# Patient Record
(demographics unavailable — no encounter records)

---

## 2025-01-24 NOTE — PHYSICAL EXAM
[Healthy Appearing] : healthy appearing [Well Nourished] : well nourished [Interactive] : interactive [Normal Appearance] : normal appearance [Well formed] : well formed [Normally Set] : normally set [6 yr Molar Erupted] : 6 year molars have erupted [12 yr Molar Eruption] : 12 year molars have erupted [None] : there were no thyroid nodules [Normal S1 and S2] : normal S1 and S2 [Clear to Ausculation Bilaterally] : clear to auscultation bilaterally [Abdomen Soft] : soft [Abdomen Tenderness] : non-tender [] : no hepatosplenomegaly [2] : was Roger stage 2 [Scant] : scant [___] : [unfilled] [Normal] : normal  [Acanthosis Nigricans___] : no acanthosis nigricans [Goiter] : no goiter [Murmur] : no murmurs [Scoliosis] : scoliosis not appreciated [de-identified] : No hyperpigmentation noted [de-identified] : PERRL, sclera clear [FreeTextEntry1] : one axillary hair on the right [de-identified] : Normal carrying angle [TextEntry] : Dr. Yessy Chand family medicine PGY-3 was the chaperone for the pubertal exam  Armspan: 152cm [normal] UE: LE Ratio: 0.9 [UE 68.7cm, LE 76.4cm] [normal]

## 2025-01-24 NOTE — REVIEW OF SYSTEMS
[TextEntry] : Review of systems positive for: anxiety, constipation  Review of systems negative for weight loss, weight gain, fatigue, appetite changes, difficulty with sleep, cold sensitivity, increased thirst, increased urination, waking at night to urinate, unexplained fevers, headaches, loss of consciousness, seizures, blurred vision, double vision, wears glasses/contacts, hearing problems, low or no sense of smell, heart murmur, palpitations, snoring, shortness of breath, abdominal pain, nausea/vomiting, joint pain, muscle pain, hair loss, rashes, dry skin, acne, easy bruising, depression, behavioral concerns, pubic hair before 9 years, body odor before 9 years, breast development

## 2025-01-24 NOTE — FAMILY HISTORY
[TextEntry] : Dad: 43 years old; 72in about 170-180 lb; no PMHx Mom: 48 years old; 62in; 165 lb; menarche at 11 years old; no PMHx Maternal Half-Brother [Miguel]: 27 years old; 8pt66gr; 180 lb; no PMHx Brother [Kodi Carr]: 15 years old; 5ft9lb; 185 lb; autism MPH 69.5in +/- 4in  MGF about 8kg11kd MGM 5ft6/7in MAunts x3 [id number], 3hu53dk, 5ft6in MUncle [id number] PGF about 5ft8in PGM about 5f6in PUncle x2 5ft9in, 5ft8in PAunt about 5ft1in/2in  Lewy Body Dementia: MGM  from it at 69 years old Obesity: PGF MGM, MGF DORA MGF  There is no known past medical history of diabetes, thyroid disease, early puberty, late puberty, short stature, disorders of calcium, other hormone conditions, high cholesterol, hypertension, bone disease, liver disease, cancer, kidney disease, lung disease, heart disease, infertility, birth defects, PCOS, celiac disease, pituitary disease, NAFLD, weight loss surgery, lupus, RA, Crohn's disease/Ulcerative colitis, MI, blood clots, adrenal problems, osteoporosis, frequent fractures.

## 2025-01-24 NOTE — ADDENDUM
[FreeTextEntry1] : Addendum 1/24/2025: - Bone Age (1/20/2025): I have independently reviewed the bone age x-ray according to the [place] [facility] [employer]. It is closest to the 12y6m standard, vs chronological age 12y2m [agree with radiology read]. The bone age is appropriate for age.  Predicted adult height is 66.95in +/- 2in and MPH 69.5in +/- 4in.   Discussed with mom the bone age result with her and I reviewed that there is some variability in the prediction. Discussed I would recommend monitoring for now and can see how he is growing in follow up. Mom told me she had a conversation with Larry and he said as long as he is predicted to be over 5ft6in he did not want to do any injections.

## 2025-01-24 NOTE — SIGNATURES
[TextEntry] : Leslie Yarbrough MD Pediatric Endocrinologist Division of Pediatric Endocrinology  [facility] at [facility]

## 2025-01-24 NOTE — CONSULT LETTER
[Dear  ___] : Dear  [unfilled], [Consult Letter:] : I had the pleasure of evaluating your patient, [unfilled]. [Please see my note below.] : Please see my note below. [Consult Closing:] : Thank you very much for allowing me to participate in the care of this patient.  If you have any questions, please do not hesitate to contact me. [Sincerely,] : Sincerely, [FreeTextEntry3] : Leslie Yarbrough MD Pediatric Endocrinologist Division of Pediatric Endocrinology  [facility] at [facility]

## 2025-01-24 NOTE — PHYSICAL EXAM
[Healthy Appearing] : healthy appearing [Well Nourished] : well nourished [Interactive] : interactive [Normal Appearance] : normal appearance [Well formed] : well formed [Normally Set] : normally set [6 yr Molar Erupted] : 6 year molars have erupted [12 yr Molar Eruption] : 12 year molars have erupted [None] : there were no thyroid nodules [Normal S1 and S2] : normal S1 and S2 [Clear to Ausculation Bilaterally] : clear to auscultation bilaterally [Abdomen Soft] : soft [Abdomen Tenderness] : non-tender [] : no hepatosplenomegaly [2] : was Roger stage 2 [Scant] : scant [___] : [unfilled] [Normal] : normal  [Acanthosis Nigricans___] : no acanthosis nigricans [Goiter] : no goiter [Murmur] : no murmurs [Scoliosis] : scoliosis not appreciated [de-identified] : No hyperpigmentation noted [de-identified] : PERRL, sclera clear [FreeTextEntry1] : one axillary hair on the right [de-identified] : Normal carrying angle [TextEntry] : Dr. Yessy Chand family medicine PGY-3 was the chaperone for the pubertal exam  Armspan: 152cm [normal] UE: LE Ratio: 0.9 [UE 68.7cm, LE 76.4cm] [normal]

## 2025-01-24 NOTE — SOCIAL HISTORY
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

## 2025-01-24 NOTE — ADDENDUM
[FreeTextEntry1] : Addendum 1/24/2025: - Bone Age (1/20/2025): I have independently reviewed the bone age x-ray according to the [place] [facility] [employer]. It is closest to the 12y6m standard, vs chronological age 12y2m [agree with radiology read]. The bone age is appropriate for age.  Predicted adult height is 66.95in +/- 2in and MPH 69.5in +/- 4in.   Discussed with mom the bone age result with her and I reviewed that there is some variability in the prediction. Discussed I would recommend monitoring for now and can see how he is growing in follow up. Mom told me she had a conversation with Larry and he said as long as he is predicted to be over 5ft6in he did not want to do any injections.

## 2025-01-24 NOTE — PAST MEDICAL HISTORY
[At Term] : at term [None] : there were no delivery complications [Age Appropriate] : age appropriate developmental milestones met [FreeTextEntry1] : 9lb1oz, BL 19in [AGA] [FreeTextEntry3] : Normal dental eruption

## 2025-01-24 NOTE — SIGNATURES
[TextEntry] : Leslie Yarbrough MD Pediatric Endocrinologist Division of Pediatric Endocrinology  [facility] at [facility]

## 2025-01-24 NOTE — FAMILY HISTORY
[TextEntry] : Dad: 43 years old; 72in about 170-180 lb; no PMHx Mom: 48 years old; 62in; 165 lb; menarche at 11 years old; no PMHx Maternal Half-Brother [Miguel]: 27 years old; 5cn75bz; 180 lb; no PMHx Brother [Kodi Carr]: 15 years old; 5ft9lb; 185 lb; autism MPH 69.5in +/- 4in  MGF about 9cg33ee MGM 5ft6/7in MAunts x3 1kc81gf, 5cd48hi, 5ft6in MUncle 0fx85ym PGF about 5ft8in PGM about 5f6in PUncle x2 5ft9in, 5ft8in PAunt about 5ft1in/2in  Lewy Body Dementia: MGM  from it at 69 years old Obesity: PGF MGM, MGF DORA MGF  There is no known past medical history of diabetes, thyroid disease, early puberty, late puberty, short stature, disorders of calcium, other hormone conditions, high cholesterol, hypertension, bone disease, liver disease, cancer, kidney disease, lung disease, heart disease, infertility, birth defects, PCOS, celiac disease, pituitary disease, NAFLD, weight loss surgery, lupus, RA, Crohn's disease/Ulcerative colitis, MI, blood clots, adrenal problems, osteoporosis, frequent fractures.

## 2025-01-24 NOTE — CONSULT LETTER
[Dear  ___] : Dear  [unfilled], [Consult Letter:] : I had the pleasure of evaluating your patient, [unfilled]. [Please see my note below.] : Please see my note below. [Consult Closing:] : Thank you very much for allowing me to participate in the care of this patient.  If you have any questions, please do not hesitate to contact me. [Sincerely,] : Sincerely, [FreeTextEntry3] : Leslie Yarbrough MD Pediatric Endocrinologist Division of Pediatric Endocrinology  [facility] at [facility]

## 2025-01-24 NOTE — ASSESSMENT
[FreeTextEntry1] : Larry is a 12y2m male here today for an initial visit with pediatric endocrinology for concerns about growth. I reviewed his growth parameters with him and his mom today (oldest brother also present) as well as his pubertal status. I discussed the normal timing of the pubertal growth spurt with them.   - Bone age today. I discussed the significance of bone age on time to grow and height prediction.  - I discussed growth screening labs. Larry did not want to do labs today. Discussed RTC for follow up in 3/4 months and if growth is concerning at that time can always do screening growth labs.  - Mom will get a referral from Dr. Calderón to see urology if he has testicular pain again. - RTC for follow up in 3/4 months to assess growth.

## 2025-01-24 NOTE — SOCIAL HISTORY
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